# Patient Record
Sex: MALE | URBAN - METROPOLITAN AREA
[De-identification: names, ages, dates, MRNs, and addresses within clinical notes are randomized per-mention and may not be internally consistent; named-entity substitution may affect disease eponyms.]

---

## 2024-09-16 ENCOUNTER — CLAIMS CREATED FROM THE CLAIM WINDOW (OUTPATIENT)
Dept: URBAN - METROPOLITAN AREA MEDICAL CENTER 18 | Facility: MEDICAL CENTER | Age: 39
End: 2024-09-16
Payer: MEDICARE

## 2024-09-16 DIAGNOSIS — K50.914 ABSCESS OF INTESTINE DUE TO CROHN'S DISEASE: ICD-10-CM

## 2024-09-16 PROCEDURE — G8427 DOCREV CUR MEDS BY ELIG CLIN: HCPCS

## 2024-09-16 PROCEDURE — 99222 1ST HOSP IP/OBS MODERATE 55: CPT

## 2024-09-16 PROCEDURE — 99254 IP/OBS CNSLTJ NEW/EST MOD 60: CPT

## 2024-09-17 ENCOUNTER — TELEPHONE ENCOUNTER (OUTPATIENT)
Dept: URBAN - METROPOLITAN AREA MEDICAL CENTER 18 | Facility: MEDICAL CENTER | Age: 39
End: 2024-09-17

## 2024-09-17 ENCOUNTER — CLAIMS CREATED FROM THE CLAIM WINDOW (OUTPATIENT)
Dept: URBAN - METROPOLITAN AREA MEDICAL CENTER 18 | Facility: MEDICAL CENTER | Age: 39
End: 2024-09-17
Payer: MEDICARE

## 2024-09-17 DIAGNOSIS — K50.914 ABSCESS OF INTESTINE DUE TO CROHN'S DISEASE: ICD-10-CM

## 2024-09-17 PROCEDURE — 99232 SBSQ HOSP IP/OBS MODERATE 35: CPT

## 2024-09-18 ENCOUNTER — CLAIMS CREATED FROM THE CLAIM WINDOW (OUTPATIENT)
Dept: URBAN - METROPOLITAN AREA MEDICAL CENTER 18 | Facility: MEDICAL CENTER | Age: 39
End: 2024-09-18
Payer: MEDICARE

## 2024-09-18 DIAGNOSIS — K50.914 ABSCESS OF INTESTINE DUE TO CROHN'S DISEASE: ICD-10-CM

## 2024-09-18 PROCEDURE — 99232 SBSQ HOSP IP/OBS MODERATE 35: CPT

## 2024-09-19 ENCOUNTER — CLAIMS CREATED FROM THE CLAIM WINDOW (OUTPATIENT)
Dept: URBAN - METROPOLITAN AREA MEDICAL CENTER 18 | Facility: MEDICAL CENTER | Age: 39
End: 2024-09-19
Payer: MEDICARE

## 2024-09-19 DIAGNOSIS — K50.914 ABSCESS OF INTESTINE DUE TO CROHN'S DISEASE: ICD-10-CM

## 2024-09-19 PROCEDURE — 99232 SBSQ HOSP IP/OBS MODERATE 35: CPT

## 2024-09-30 ENCOUNTER — OFFICE VISIT (OUTPATIENT)
Dept: URBAN - METROPOLITAN AREA CLINIC 80 | Facility: CLINIC | Age: 39
End: 2024-09-30

## 2024-09-30 ENCOUNTER — LAB OUTSIDE AN ENCOUNTER (OUTPATIENT)
Dept: URBAN - METROPOLITAN AREA CLINIC 80 | Facility: CLINIC | Age: 39
End: 2024-09-30

## 2024-09-30 ENCOUNTER — TELEPHONE ENCOUNTER (OUTPATIENT)
Dept: URBAN - METROPOLITAN AREA CLINIC 80 | Facility: CLINIC | Age: 39
End: 2024-09-30

## 2024-09-30 ENCOUNTER — DASHBOARD ENCOUNTERS (OUTPATIENT)
Age: 39
End: 2024-09-30

## 2024-09-30 VITALS
HEIGHT: 71 IN | BODY MASS INDEX: 25.09 KG/M2 | DIASTOLIC BLOOD PRESSURE: 80 MMHG | WEIGHT: 179.2 LBS | SYSTOLIC BLOOD PRESSURE: 118 MMHG | TEMPERATURE: 97.5 F | HEART RATE: 79 BPM

## 2024-09-30 PROBLEM — 34000006: Status: ACTIVE | Noted: 2024-09-30

## 2024-09-30 PROBLEM — 271737000: Status: ACTIVE | Noted: 2024-09-30

## 2024-09-30 RX ORDER — DIPHENOXYLATE HYDROCHLORIDE AND ATROPINE SULFATE 2.5; .025 MG/1; MG/1
TABLET ORAL
Qty: 90 TABLET | Status: ACTIVE | COMMUNITY

## 2024-09-30 RX ORDER — DICYCLOMINE HYDROCHLORIDE 20 MG/1
TABLET ORAL
Qty: 60 TABLET | Status: ACTIVE | COMMUNITY

## 2024-09-30 RX ORDER — PREDNISONE 10 MG/1
TAKE 4 TABS PO X 1 WEEK, TAKE 3 TABS PO X 1 WEEK, TAKE 2 TABS PO X 1 WEEK, TAKE 1 TAB PO X 1 WEEK TABLET ORAL ONCE A DAY
Qty: 70 | Refills: 0 | OUTPATIENT
Start: 2024-09-30

## 2024-09-30 NOTE — HPI-TODAY'S VISIT:
Patient, with a hx of Crohn's disease, was seen at [facility] ER on 9/15/2024 c/o severe abdominal pain, n/v, and ?feculant emesis. Labs showed total bili 0.4, alk phos 60, AST 17, ALT 10, WBC high at 13.94, hgb low at 10.4, hct low at 33.6, MCV normal at 82.6. CT abd/pelvis showed Impression . . Severe inflammatory changes of the rectum compatible with proctitis with associated perirectal abscess. There is luminal narrowing of the rectum at the level of the abscess with proximal dilatation of the colon and small bowel suggesting at least partial obstruction. Small volume abdominopelvic ascites. Cholelithiasis. Patient was transferred from [place] to [facility] and general surgery saw him for presacral abscess. They advised IR place drain, noting that a presacral approach would potentially disrupt the ileal anal pouch. GI saw the pt on 9/16, noted that pt is s/p total proctocolectomy with ileal anal pouch. Pt noted he has had diarrhea x 11 years, and that his GI doctor is in [place] as he recently moved. 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 Plan was for pt to eventually start on biologic therapy, as he was currently managing on lomotil and creon. Initial diagnosis of UC made in 2015    Per GI consult note 9/18: Had lap total abdominal colectomy w/ end ileostomy (8/15/17) followed by lap completion proctectomy w/ Ileoanal J pouch anastomosis, diverting loop ileostomy (2/12/18), and the loop ileostomy reversal w/ small bowel resection, enteroenterostomy, and parastomal hernia repair (6/11/18) for treatment of his IBD. He was initially on Humira but was stopped due to patient losing insurance leading to flare which resulted in 8/17 hospitalization and total abdominal colectomy w/ end ileostomy which then led to the cascade of operations to follow. Humira was never restarted and patient did not follow up with a gastroenterologist due to no insurance coverage.  Colonoscopy 7/19/17:  Impression:  - Preparation of the colon was fair.    - The cecum is normal. Biopsied.   - The examined portion of the ileum was normal. Biopsied.    - Pancolitis. Inflammation was found from the rectum to the ascending colon. This was severe. Biopsies were taken from  ascending,transverse,descending,sigmoid and rectum.  Recommendation:  - Await pathology results.  - Return patient to hospital [facility] for ongoing care.   - Repeat colonoscopy at appointment to be scheduled to evaluate the response to therapy.   FINAL DIAGNOSIS Colonoscopy 7/19/17  A. Duodenum, biopsy: Duodenal mucosa with no significant pathologic alteration  B. Stomach, biopsy:Antral and oxyntic type mucosa with mild chronic gastritis No evidence of H. Pylori by routine stain  C. Esophagus, biopsy:Candida esophagitis GMS stain highlights fungal organisms D. Colon, cecum, biopsy:Focal active colitis , mild E. Terminal Ileum, biopsy:Small bowel mucosa with no significant pathologic alteration  F. Colon, ascending, biopsy:Chronic active colitis, moderate Negative for dysplasia G. Colon, transverse, biopsy: Chronic active colitis, moderate Negative for dysplasia H. Colon, descending, biopsy: Chronic active colitis, moderate - severe Negative for dysplasia I. Colon, sigmoid, biopsy: Chronic active colitis, severe Negative for dysplasia Immunohistochemical stain for CMV is negative J. Rectum, biopsy: Chronic active colitis, moderate - severe Negative for dysplasia    Presacral abscess noted on CT with mass effect on rectum and luminal narrowing with upstream dilatation to 3.5cm drained by IR.   CT guided aspiration on 9/17. Cultures grew e coli (R amp). ID saw pt and advised - Continue Zosyn at the hospital.    Quant gold and hepatitis panel negative.   KUB on 9/19/2024 showed FINDINGS: Unchanged dilated gas-filled loops of large bowel and small bowel without significant rectal gas. Exam technique precludes evaluation for free intraperitoneal air. Subsegmental atelectasis in the lung bases. Stable osseous structures  Patient was discharged on levaquin and flagyl.   Labs on 9/20 showed Alk phos 50, AST 20, ALT 24, total bili 0.2, WBC 6.91, hgb low at 10.0,  hct low at 33.5, MCV normal at 84.6.   TSH normal at 0.52 on 9.16   Lipid panel 9/16 showed normal cholestrol total 118, triglycerides 76, HDL 48, LDL 55, non HDL cholesterol 70.    Today: patient notes that he is doing much better since the hospital. Pt notes that he is having 10 BM a day bss 7. Pt notes that he has not had a solid BM in over 10 years. Pt notes that this is his baseline. Pt notes prior to hospital admission he would have blood in his stool. Seen in both the toilet and on the tissue. Pt notes that he only sees blood during a flare. He notes no blood in his BM since the hospital stay. Patient notes that he has had multiple fistulas through the years. He is unsure if he still have a fistula, per pt has seen colorectal and was told that "it was to high to reach" and there was nothing to be done about the fisula that was most recently diagnosed.   Before most recent hospital stay pt notes he was having a BM 25+ times per day.   Patient notes he "is always in pain", he takes percocet for this pain. Pt notes pain is generalized abdominal pain. The pain worsened prior to the hospital visit, but this pain has been present for years. Patient notes that he started having increased abdominal pain, mucus, and blood in stool about 1 week prior to going to the hospital. Pain has significantly improved since the hospital admission and abx. Patient notes that he is still having pain but it is basically back to his baseline. Patient notes has not done a course of steroids in years.   Patient notes that area of IR drainage no longer has inflammation.   He does think that he still has anal ulcers-notes that he has had these for years.   denies any EIM     Patient notes that he had a colonoscopy in [place] in 6 months ago-this showed a fistula.     No family history of colon polyps or colon cancer. denies any known family history of GI related cancer. Patient denies nausea, dysphagia. Patient denies blood thinner use, pacemaker/defibrillator, diabetes, kidney disease, and home O2.     Denies any heart or lung issues   occasional heartburn x years. Pt takes prn pepto and tums.   Patient notes having a dark stool once this afternoon, but did take pepto yesterday.   He has had these episodes in the past, notes that stools are black about once a week x years. Notes that this usually happens prior to rectal bleeding.   denies being dizzy, lightheaded, or like he is going to pass out.   Pt notes he used to be a heavy drinker-but notes that he is no longer a heavy drinker-only drinks occasionally   Pt notes he may be moving out of the country seen and cannot do an infusion on a regular basis.   pt is currently taking lomotil and dicyclomine   denies fever/chills     Pt notes last time he was on a biologic was 10 years ago-it was Humira and he was on it for 6 months without much relief.

## 2024-09-30 NOTE — PHYSICAL EXAM GASTROINTESTINAL
Abdomen , soft, nontender, nondistended , no guarding or rigidity , no masses palpable , normal bowel sounds, negative Staples's sign, negative Rovsing's, negative psoas and obturator signs, negative CVA tenderness bilaterally Liver and Spleen , no hepatosplenomegaly Rectal , deferred

## 2024-10-16 ENCOUNTER — TELEPHONE ENCOUNTER (OUTPATIENT)
Dept: URBAN - METROPOLITAN AREA CLINIC 80 | Facility: CLINIC | Age: 39
End: 2024-10-16

## 2024-10-16 RX ORDER — POLYETHYLENE GLYCOL 3350, SODIUM SULFATE, SODIUM CHLORIDE, POTASSIUM CHLORIDE, ASCORBIC ACID, SODIUM ASCORBATE 140-9-5.2G
AS DIRECTED KIT ORAL AS DIRECTED
Qty: 1 | Refills: 0 | OUTPATIENT
Start: 2024-10-17

## 2024-10-16 RX ORDER — DICYCLOMINE HYDROCHLORIDE 20 MG/1
1 TABLET TABLET ORAL THREE TIMES A DAY
Qty: 90 | Refills: 1 | OUTPATIENT
Start: 2024-10-17 | End: 2024-12-15

## 2024-10-17 ENCOUNTER — OFFICE VISIT (OUTPATIENT)
Dept: URBAN - METROPOLITAN AREA CLINIC 80 | Facility: CLINIC | Age: 39
End: 2024-10-17

## 2024-10-18 ENCOUNTER — TELEPHONE ENCOUNTER (OUTPATIENT)
Dept: URBAN - METROPOLITAN AREA CLINIC 80 | Facility: CLINIC | Age: 39
End: 2024-10-18

## 2024-10-18 PROBLEM — 87522002: Status: ACTIVE | Noted: 2024-10-18

## 2024-10-18 RX ORDER — UPADACITINIB 45 MG/1
1 TABLET TABLET, EXTENDED RELEASE ORAL ONCE A DAY
Qty: 84 TABLET | Refills: 0 | OUTPATIENT
Start: 2024-10-24 | End: 2025-01-15

## 2024-10-18 RX ORDER — UPADACITINIB 30 MG/1
1 TABLET TABLET, EXTENDED RELEASE ORAL ONCE A DAY
Qty: 90 TABLET | Refills: 3 | OUTPATIENT
Start: 2024-10-24 | End: 2025-10-19

## 2024-10-18 RX ORDER — ERGOCALCIFEROL 1.25 MG/1
1 CAPSULE CAPSULE ORAL
Qty: 8 | Refills: 0 | OUTPATIENT
Start: 2024-10-18 | End: 2024-12-16

## 2024-10-18 RX ORDER — FERRIC CARBOXYMALTOSE INJECTION 50 MG/ML
AS DIRECTED INJECTION, SOLUTION INTRAVENOUS
OUTPATIENT
Start: 2024-10-18 | End: 2024-11-01

## 2024-10-24 ENCOUNTER — TELEPHONE ENCOUNTER (OUTPATIENT)
Dept: URBAN - METROPOLITAN AREA CLINIC 97 | Facility: CLINIC | Age: 39
End: 2024-10-24

## 2024-10-25 ENCOUNTER — TELEPHONE ENCOUNTER (OUTPATIENT)
Dept: URBAN - METROPOLITAN AREA CLINIC 80 | Facility: CLINIC | Age: 39
End: 2024-10-25

## 2024-10-30 ENCOUNTER — TELEPHONE ENCOUNTER (OUTPATIENT)
Dept: URBAN - METROPOLITAN AREA CLINIC 80 | Facility: CLINIC | Age: 39
End: 2024-10-30

## 2024-10-30 ENCOUNTER — OFFICE VISIT (OUTPATIENT)
Dept: URBAN - METROPOLITAN AREA SURGERY CENTER 19 | Facility: SURGERY CENTER | Age: 39
End: 2024-10-30

## 2024-10-30 RX ORDER — FERRIC CARBOXYMALTOSE INJECTION 50 MG/ML
AS DIRECTED INJECTION, SOLUTION INTRAVENOUS
Status: ACTIVE | COMMUNITY
Start: 2024-10-18 | End: 2024-11-01

## 2024-10-30 RX ORDER — PREDNISONE 10 MG/1
TAKE 4 TABS PO X 1 WEEK, TAKE 3 TABS PO X 1 WEEK, TAKE 2 TABS PO X 1 WEEK, TAKE 1 TAB PO X 1 WEEK TABLET ORAL ONCE A DAY
Qty: 70 | Refills: 0 | Status: ACTIVE | COMMUNITY
Start: 2024-09-30

## 2024-10-30 RX ORDER — POLYETHYLENE GLYCOL 3350, SODIUM SULFATE, SODIUM CHLORIDE, POTASSIUM CHLORIDE, ASCORBIC ACID, SODIUM ASCORBATE 140-9-5.2G
AS DIRECTED KIT ORAL AS DIRECTED
Qty: 1 | Refills: 0 | Status: ACTIVE | COMMUNITY
Start: 2024-10-17

## 2024-10-30 RX ORDER — DICYCLOMINE HYDROCHLORIDE 20 MG/1
1 TABLET TABLET ORAL THREE TIMES A DAY
Qty: 90 | Refills: 1 | Status: ACTIVE | COMMUNITY
Start: 2024-10-17 | End: 2024-12-15

## 2024-10-30 RX ORDER — UPADACITINIB 30 MG/1
1 TABLET TABLET, EXTENDED RELEASE ORAL ONCE A DAY
Qty: 90 TABLET | Refills: 3 | Status: ACTIVE | COMMUNITY
Start: 2024-10-24 | End: 2025-10-19

## 2024-10-30 RX ORDER — UPADACITINIB 45 MG/1
1 TABLET TABLET, EXTENDED RELEASE ORAL ONCE A DAY
Qty: 84 TABLET | Refills: 0 | Status: ACTIVE | COMMUNITY
Start: 2024-10-24 | End: 2025-01-15

## 2024-10-30 RX ORDER — DICYCLOMINE HYDROCHLORIDE 20 MG/1
TABLET ORAL
Qty: 60 TABLET | Status: ACTIVE | COMMUNITY

## 2024-10-30 RX ORDER — ERGOCALCIFEROL 1.25 MG/1
1 CAPSULE CAPSULE ORAL
Qty: 8 | Refills: 0 | Status: ACTIVE | COMMUNITY
Start: 2024-10-18 | End: 2024-12-16

## 2024-10-30 RX ORDER — DIPHENOXYLATE HYDROCHLORIDE AND ATROPINE SULFATE 2.5; .025 MG/1; MG/1
TABLET ORAL
Qty: 90 TABLET | Status: ACTIVE | COMMUNITY

## 2024-10-31 ENCOUNTER — LAB OUTSIDE AN ENCOUNTER (OUTPATIENT)
Dept: URBAN - METROPOLITAN AREA CLINIC 80 | Facility: CLINIC | Age: 39
End: 2024-10-31

## 2024-10-31 ENCOUNTER — OFFICE VISIT (OUTPATIENT)
Dept: URBAN - METROPOLITAN AREA SURGERY CENTER 19 | Facility: SURGERY CENTER | Age: 39
End: 2024-10-31

## 2024-10-31 LAB
DATE:: (no result)
Lab: (no result)
SPECIMEN(S) SUBMITTED:: (no result)
TEST ORDERED ON REQ:: (no result)
UNCLEAR ORDER:: (no result)

## 2024-11-05 ENCOUNTER — TELEPHONE ENCOUNTER (OUTPATIENT)
Dept: URBAN - METROPOLITAN AREA CLINIC 80 | Facility: CLINIC | Age: 39
End: 2024-11-05

## 2024-11-05 RX ORDER — DIPHENOXYLATE HYDROCHLORIDE AND ATROPINE SULFATE 2.5; .025 MG/1; MG/1
1 TABLET AS NEEDED TABLET ORAL
Qty: 120 TABLET | Refills: 0 | OUTPATIENT
Start: 2024-11-06 | End: 2024-12-06

## 2024-11-12 ENCOUNTER — TELEPHONE ENCOUNTER (OUTPATIENT)
Dept: URBAN - METROPOLITAN AREA CLINIC 80 | Facility: CLINIC | Age: 39
End: 2024-11-12

## 2024-11-12 ENCOUNTER — ERX REFILL RESPONSE (OUTPATIENT)
Dept: URBAN - METROPOLITAN AREA CLINIC 80 | Facility: CLINIC | Age: 39
End: 2024-11-12

## 2024-11-12 RX ORDER — DICYCLOMINE HYDROCHLORIDE 20 MG/1
1 TABLET TABLET ORAL THREE TIMES A DAY
Qty: 90 | Refills: 1 | OUTPATIENT

## 2024-11-13 ENCOUNTER — CLAIMS CREATED FROM THE CLAIM WINDOW (OUTPATIENT)
Dept: URBAN - METROPOLITAN AREA MEDICAL CENTER 18 | Facility: MEDICAL CENTER | Age: 39
End: 2024-11-13
Payer: MEDICARE

## 2024-11-13 DIAGNOSIS — K50.813 CROHN'S DISEASE OF BOTH SMALL AND LARGE INTESTINE W FISTULA: ICD-10-CM

## 2024-11-13 DIAGNOSIS — K50.814 ABSCESS OF INTESTINE DUE TO CROHN'S DISEASE OF SMALL AND LARGE INTESTINES: ICD-10-CM

## 2024-11-13 PROCEDURE — G8427 DOCREV CUR MEDS BY ELIG CLIN: HCPCS

## 2024-11-13 PROCEDURE — 99255 IP/OBS CONSLTJ NEW/EST HI 80: CPT

## 2024-11-13 PROCEDURE — 99223 1ST HOSP IP/OBS HIGH 75: CPT

## 2024-11-14 ENCOUNTER — CLAIMS CREATED FROM THE CLAIM WINDOW (OUTPATIENT)
Dept: URBAN - METROPOLITAN AREA MEDICAL CENTER 18 | Facility: MEDICAL CENTER | Age: 39
End: 2024-11-14
Payer: MEDICARE

## 2024-11-14 ENCOUNTER — TELEPHONE ENCOUNTER (OUTPATIENT)
Dept: URBAN - METROPOLITAN AREA CLINIC 80 | Facility: CLINIC | Age: 39
End: 2024-11-14

## 2024-11-14 DIAGNOSIS — K50.818 CROHN'S DISEASE OF BOTH SMALL AND LARGE INTESTINE WITH OTHER COMPLICATION: ICD-10-CM

## 2024-11-14 PROCEDURE — 99232 SBSQ HOSP IP/OBS MODERATE 35: CPT

## 2024-11-15 ENCOUNTER — CLAIMS CREATED FROM THE CLAIM WINDOW (OUTPATIENT)
Dept: URBAN - METROPOLITAN AREA MEDICAL CENTER 18 | Facility: MEDICAL CENTER | Age: 39
End: 2024-11-15
Payer: MEDICARE

## 2024-11-15 DIAGNOSIS — K50.818 CROHN'S DISEASE OF BOTH SMALL AND LARGE INTESTINE WITH OTHER COMPLICATION: ICD-10-CM

## 2024-11-15 PROCEDURE — 99232 SBSQ HOSP IP/OBS MODERATE 35: CPT

## 2024-11-26 ENCOUNTER — TELEPHONE ENCOUNTER (OUTPATIENT)
Dept: URBAN - METROPOLITAN AREA CLINIC 80 | Facility: CLINIC | Age: 39
End: 2024-11-26

## 2024-12-02 ENCOUNTER — ERX REFILL RESPONSE (OUTPATIENT)
Dept: URBAN - METROPOLITAN AREA CLINIC 80 | Facility: CLINIC | Age: 39
End: 2024-12-02

## 2024-12-02 RX ORDER — DICYCLOMINE HYDROCHLORIDE 20 MG/1
1 TABLET TABLET ORAL THREE TIMES A DAY
Qty: 90 | Refills: 1 | OUTPATIENT

## 2024-12-02 RX ORDER — DICYCLOMINE HYDROCHLORIDE 20 MG/1
1 TABLET AS NEEDED TABLET ORAL THREE TIMES A DAY
Qty: 180 | Refills: 1 | OUTPATIENT

## 2024-12-24 ENCOUNTER — LAB OUTSIDE AN ENCOUNTER (OUTPATIENT)
Dept: URBAN - METROPOLITAN AREA CLINIC 80 | Facility: CLINIC | Age: 39
End: 2024-12-24

## 2025-04-28 ENCOUNTER — ERX REFILL RESPONSE (OUTPATIENT)
Dept: URBAN - METROPOLITAN AREA CLINIC 80 | Facility: CLINIC | Age: 40
End: 2025-04-28

## 2025-04-28 RX ORDER — DICYCLOMINE HYDROCHLORIDE 20 MG/1
1 TABLET AS NEEDED TABLET ORAL THREE TIMES A DAY
Qty: 180 | Refills: 1

## 2025-05-27 ENCOUNTER — TELEPHONE ENCOUNTER (OUTPATIENT)
Dept: URBAN - METROPOLITAN AREA CLINIC 80 | Facility: CLINIC | Age: 40
End: 2025-05-27

## 2025-06-20 ENCOUNTER — OFFICE VISIT (OUTPATIENT)
Dept: URBAN - METROPOLITAN AREA CLINIC 80 | Facility: CLINIC | Age: 40
End: 2025-06-20
Payer: MEDICARE

## 2025-06-20 DIAGNOSIS — K50.918 CROHN'S DISEASE WITH OTHER COMPLICATION, UNSPECIFIED GASTROINTESTINAL TRACT LOCATION: ICD-10-CM

## 2025-06-20 DIAGNOSIS — K80.20 GALL STONES: ICD-10-CM

## 2025-06-20 DIAGNOSIS — R79.89 LOW VITAMIN D LEVEL: ICD-10-CM

## 2025-06-20 DIAGNOSIS — R12 HEARTBURN: ICD-10-CM

## 2025-06-20 DIAGNOSIS — D64.9 ANEMIA, UNSPECIFIED TYPE: ICD-10-CM

## 2025-06-20 PROBLEM — 235919008: Status: ACTIVE | Noted: 2025-06-20

## 2025-06-20 PROCEDURE — 99215 OFFICE O/P EST HI 40 MIN: CPT

## 2025-06-20 RX ORDER — DIPHENOXYLATE HYDROCHLORIDE AND ATROPINE SULFATE 2.5; .025 MG/1; MG/1
TABLET ORAL
Qty: 90 TABLET | Status: ACTIVE | COMMUNITY

## 2025-06-20 RX ORDER — UPADACITINIB 30 MG/1
1 TABLET TABLET, EXTENDED RELEASE ORAL ONCE A DAY
Qty: 90 TABLET | Refills: 3 | Status: ACTIVE | COMMUNITY
Start: 2024-10-24 | End: 2025-10-19

## 2025-06-20 RX ORDER — PREDNISONE 10 MG/1
TAKE 4 TABS PO X 1 WEEK, TAKE 3 TABS PO X 1 WEEK, TAKE 2 TABS PO X 1 WEEK, TAKE 1 TAB PO X 1 WEEK TABLET ORAL ONCE A DAY
Qty: 70 | Refills: 0 | Status: ACTIVE | COMMUNITY
Start: 2024-09-30

## 2025-06-20 RX ORDER — DICYCLOMINE HYDROCHLORIDE 20 MG/1
1 TABLET AS NEEDED TABLET ORAL THREE TIMES A DAY
Qty: 180 | Refills: 1 | Status: ACTIVE | COMMUNITY

## 2025-06-20 RX ORDER — POLYETHYLENE GLYCOL 3350, SODIUM SULFATE, SODIUM CHLORIDE, POTASSIUM CHLORIDE, ASCORBIC ACID, SODIUM ASCORBATE 140-9-5.2G
AS DIRECTED KIT ORAL AS DIRECTED
Qty: 1 | Refills: 0 | Status: ACTIVE | COMMUNITY
Start: 2024-10-17

## 2025-06-20 NOTE — HPI-ZZZTODAY'S VISIT
Patient with a complex past medical history of Crohn's.  last seen in the office 9/30/2024.  He was put on a prednisone taper at this appointment.  Noted that he was hospitalized prior in the month for severe abdominal pain and nausea vomiting.  Found to have severe inflammatory changes of the rectum compatible with proctitis with associated Dwain rectal abscess and at least partial obstruction of the small bowel.  He was transferred from [facility] to [facility] general surgery for the presacral abscess.  An IR drain was placed and general surgery noted that the presacral  approach would potentially disrupt his ileoanal pouch.   History includes had a long lap total abdominal colectomy with end ileostomy 8/15/2017 followed by lap completion proctocolectomy with ileoanal J-pouch anastomosis, diverting loop ileostomy 2/12/2018 and the loop ileostomy reversal with small bowel resection, enteroenterostomy and parastomal hernia repair 6/11/2019 for treatment of IBD.  In the past patient has been on Humira but it was stopped due to patient losing insurance in 2017 this led to a flare and total abdominal colectomy with end ileostomy which led to the cascade of operations to follow.  Humira was never restarted.  Also noted the patient had occasional heartburn for years taking as needed Pepto and Tums.  Noted that patient was moving out of the country and would not be able to consider infusions on a regular basis.  Noted that patient was also taking dicyclomine and Lomotil as needed. Patient had an EGD and colonoscopy 10/30/2024 colonoscopy showed poor prep, Crohn's disease inflammation was found was mild in severity, fistula with pustular drainage seen in the distal J-pouch, could not transverse into the proximal limb of the J-pouch. EGD showed normal esophagus, Z-line regular, normal stomach, biopsies taken in the gastric antrum gastric body and incisor, normal duodenal bulb and second portion of the duodenum and third portion of the duodenum. 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  Noted the patient would need a repeat colonoscopy to evaluate his response to therapy.  Patient was started on Rinvoq. Patient was most recently admitted November 12, 2024 through November 16.  Noted the patient with a past medical history of complex Crohn's disease status post proctocolectomy of the J-pouch admitted for recurrent perirectal abscess.  Noted recurrent perirectal abscess likely d/t chronic J pouch anastomotic leak.  MRI L/sacrum with presacral fluid collection 2.4 x 1.2 cm.  IR was consulted and no drainable collection.  Colorectal surgery recommending J-pouch takedown and end ileostomy in the future no acute intervention.  Patient wanted to do a trial period with biologic management before making a final decision in regard to surgery.  He is status post Zosyn during this admission.  He was told to continue rinvoq and continue a steroid taper for 7 days.  He was discharged on Augmentin 875 twice a day plus Bactrim twice a day for 3 months. Most recent labs completed on 11/16/2024 showed sodium 138, potassium 3.4, white blood cell count 7.38, hemoglobin 7.7, hematocrit 26.0 Most recent LFTs completed 9/20/2024 showing alk phos 50, AST 20, ALT 24, total bilirubin 0.2. Patient had a CTE on 5/20/2025 showing again noted postoperative changes from proctocolectomy with ileal pouch anal anastomosis.  No evidence of irregular small bowel wall thickening, mural stratification, or mucosal enhancement.  impression states postoperative changes from proctocolectomy with ileal pouch to anal anastomosis, irregular soft tissue thickening and inflammatory stranding in the presacral space along the distal anastomosis with an associated rim-enhancing fluid and gas collection.  This is suggestive of a presacral abscess.  Irregular sclerosis along the anterior aspect of the S4 vertebral body, immediately adjacent to the presacral abscess this is suggestive of chronic osteomyelitis.  Mildly distended gallbladder with cholelithiasis, bilateral nonobstructive renal calculi. Of note patient did have evidence of chronic osteomyelitis on a CT that was completed prior to hospitalization 11/7/2024 noted on the CT chronic anterior sacral mild cortical thickening with focal area of sclerosis involving S4 which may represent changes of osteomyelitis. During his most recent hospitalization he had an MRI that showed chronic changes no signs of acute osteomyelitis. Patient saw infectious disease 2/4/2025 for presacral abscess, sacral osteitis vs chronic osteomyelitis and Crohn's disease he was advised to continue p.o. Augmentin 875 twice a day and Bactrim twice a day through 2/15/2025 he was given a refill of these antibiotics and was given a referral to a pain clinic per patient request.   Patient presents today as a follow up. He notes that his abdominal pain has improved. He notes he is having the pain in on occasion his lower abdomen that he knows is his "crohns". Went to a pain specialist and was put on Percocet. Anytime he has pain he will take a percocet and bentyl.  He is not taking lomotil at this point  he is taking rinvoq daily  he is still needing imodium 3 times a day  he is taking benyl daily  he has not had any fever or chills  He is having a BM 5-6 times per day bss 6-7 He is about to go to [place] for a month to enter a mental health facility  he is having blood in his stool once every 2 weeks  pt notes heartburn is well controlled on prn tums  Last time he saw [facility] was when he was hospitalized in November He last had an iron infusion in 12/2024 Pat[facility] denies blood thinner use, pacemaker/defibrillator, diabetes, kidney disease, and home O2.

## 2025-06-20 NOTE — PHYSICAL EXAM HENT:
Creatinine 1.76 on admission. Baseline 1.25-1.4.  follows with nephrology as op  Avoid hypotension, nephrotoxins   Head,  normocephalic,  atraumatic,  Face,  Face within normal limits,  Ears,  External ears within normal limits,  Nose/Nasopharynx,  External nose  normal appearance, no nasal discharge,  Mouth and Throat,  Oral cavity appearance normal Lips,  Appearance normal

## 2025-06-25 ENCOUNTER — CLAIMS CREATED FROM THE CLAIM WINDOW (OUTPATIENT)
Dept: URBAN - METROPOLITAN AREA MEDICAL CENTER 18 | Facility: MEDICAL CENTER | Age: 40
End: 2025-06-25
Payer: MEDICARE

## 2025-06-25 DIAGNOSIS — K50.019 CROHN'S DISEASE OF SMALL INTESTINE WITH UNSPECIFIED COMPLICATIONS: ICD-10-CM

## 2025-06-25 PROCEDURE — 99253 IP/OBS CNSLTJ NEW/EST LOW 45: CPT | Performed by: INTERNAL MEDICINE

## 2025-06-26 ENCOUNTER — CLAIMS CREATED FROM THE CLAIM WINDOW (OUTPATIENT)
Dept: URBAN - METROPOLITAN AREA MEDICAL CENTER 18 | Facility: MEDICAL CENTER | Age: 40
End: 2025-06-26
Payer: MEDICARE

## 2025-06-26 DIAGNOSIS — K50.019 CROHN'S DISEASE OF SMALL INTESTINE WITH UNSPECIFIED COMPLICATIONS: ICD-10-CM

## 2025-06-26 PROCEDURE — 99232 SBSQ HOSP IP/OBS MODERATE 35: CPT | Performed by: INTERNAL MEDICINE

## 2025-06-28 ENCOUNTER — CLAIMS CREATED FROM THE CLAIM WINDOW (OUTPATIENT)
Dept: URBAN - METROPOLITAN AREA MEDICAL CENTER 18 | Facility: MEDICAL CENTER | Age: 40
End: 2025-06-28
Payer: MEDICARE

## 2025-06-28 DIAGNOSIS — K50.90 CROHN'S DISEASE, UNSPECIFIED, WITHOUT COMPLICATIONS: ICD-10-CM

## 2025-06-28 PROCEDURE — 99233 SBSQ HOSP IP/OBS HIGH 50: CPT | Performed by: INTERNAL MEDICINE

## 2025-07-08 ENCOUNTER — TELEPHONE ENCOUNTER (OUTPATIENT)
Dept: URBAN - METROPOLITAN AREA MEDICAL CENTER 18 | Facility: MEDICAL CENTER | Age: 40
End: 2025-07-08

## 2025-07-08 ENCOUNTER — TELEPHONE ENCOUNTER (OUTPATIENT)
Dept: URBAN - METROPOLITAN AREA CLINIC 80 | Facility: CLINIC | Age: 40
End: 2025-07-08

## 2025-07-10 ENCOUNTER — CLAIMS CREATED FROM THE CLAIM WINDOW (OUTPATIENT)
Dept: URBAN - METROPOLITAN AREA CLINIC 4 | Facility: CLINIC | Age: 40
End: 2025-07-10
Payer: MEDICARE

## 2025-07-10 ENCOUNTER — OFFICE VISIT (OUTPATIENT)
Dept: URBAN - METROPOLITAN AREA SURGERY CENTER 19 | Facility: SURGERY CENTER | Age: 40
End: 2025-07-10
Payer: MEDICARE

## 2025-07-10 ENCOUNTER — TELEPHONE ENCOUNTER (OUTPATIENT)
Dept: URBAN - METROPOLITAN AREA CLINIC 80 | Facility: CLINIC | Age: 40
End: 2025-07-10

## 2025-07-10 DIAGNOSIS — K50.80 CROHN'S DISEASE OF BOTH SMALL AND LARGE INTESTINE WITHOUT COMPLICATION: ICD-10-CM

## 2025-07-10 DIAGNOSIS — K63.89 OTHER SPECIFIED DISEASES OF INTESTINE: ICD-10-CM

## 2025-07-10 DIAGNOSIS — K63.3 ULCER OF INTESTINE: ICD-10-CM

## 2025-07-10 DIAGNOSIS — Z09 ENCOUNTER FOR FOLLOW-UP EXAMINATION AFTER COMPLETED TREATMENT FOR CONDITIONS OTHER THAN MALIGNANT NEOPLASM: ICD-10-CM

## 2025-07-10 DIAGNOSIS — K62.89 PROCTITIS: ICD-10-CM

## 2025-07-10 DIAGNOSIS — Z98.0 INTESTINAL BYPASS AND ANASTOMOSIS STATUS: ICD-10-CM

## 2025-07-10 DIAGNOSIS — K62.89 OTHER SPECIFIED DISEASES OF ANUS AND RECTUM: ICD-10-CM

## 2025-07-10 DIAGNOSIS — K50.90 CROHN DISEASE: ICD-10-CM

## 2025-07-10 PROCEDURE — 44386 ENDOSCOPY BOWEL POUCH/BIOP: CPT | Performed by: CLINIC/CENTER

## 2025-07-10 PROCEDURE — 00811 ANES LWR INTST NDSC NOS: CPT | Performed by: NURSE ANESTHETIST, CERTIFIED REGISTERED

## 2025-07-10 PROCEDURE — 44386 ENDOSCOPY BOWEL POUCH/BIOP: CPT | Performed by: STUDENT IN AN ORGANIZED HEALTH CARE EDUCATION/TRAINING PROGRAM

## 2025-07-10 PROCEDURE — 88305 TISSUE EXAM BY PATHOLOGIST: CPT | Performed by: PATHOLOGY

## 2025-07-10 RX ORDER — PREDNISONE 10 MG/1
TAKE 4 TABS PO X 1 WEEK, TAKE 3 TABS PO X 1 WEEK, TAKE 2 TABS PO X 1 WEEK, TAKE 1 TAB PO X 1 WEEK TABLET ORAL ONCE A DAY
Qty: 70 | Refills: 0 | Status: ACTIVE | COMMUNITY
Start: 2024-09-30

## 2025-07-10 RX ORDER — DIPHENOXYLATE HYDROCHLORIDE AND ATROPINE SULFATE 2.5; .025 MG/1; MG/1
TABLET ORAL
Qty: 90 TABLET | Status: ACTIVE | COMMUNITY

## 2025-07-10 RX ORDER — POLYETHYLENE GLYCOL 3350, SODIUM SULFATE, SODIUM CHLORIDE, POTASSIUM CHLORIDE, ASCORBIC ACID, SODIUM ASCORBATE 140-9-5.2G
AS DIRECTED KIT ORAL AS DIRECTED
Qty: 1 | Refills: 0 | Status: ACTIVE | COMMUNITY
Start: 2024-10-17

## 2025-07-10 RX ORDER — DICYCLOMINE HYDROCHLORIDE 20 MG/1
1 TABLET AS NEEDED TABLET ORAL THREE TIMES A DAY
Qty: 180 | Refills: 1 | Status: ACTIVE | COMMUNITY

## 2025-07-10 RX ORDER — UPADACITINIB 30 MG/1
1 TABLET TABLET, EXTENDED RELEASE ORAL ONCE A DAY
Qty: 90 TABLET | Refills: 3 | Status: ACTIVE | COMMUNITY
Start: 2024-10-24 | End: 2025-10-19

## 2025-07-11 ENCOUNTER — TELEPHONE ENCOUNTER (OUTPATIENT)
Dept: URBAN - METROPOLITAN AREA CLINIC 50 | Facility: CLINIC | Age: 40
End: 2025-07-11

## 2025-07-30 ENCOUNTER — TELEPHONE ENCOUNTER (OUTPATIENT)
Dept: URBAN - METROPOLITAN AREA CLINIC 79 | Facility: CLINIC | Age: 40
End: 2025-07-30

## 2025-07-31 ENCOUNTER — ERX REFILL RESPONSE (OUTPATIENT)
Dept: URBAN - METROPOLITAN AREA CLINIC 80 | Facility: CLINIC | Age: 40
End: 2025-07-31

## 2025-07-31 ENCOUNTER — TELEPHONE ENCOUNTER (OUTPATIENT)
Dept: URBAN - METROPOLITAN AREA CLINIC 50 | Facility: CLINIC | Age: 40
End: 2025-07-31

## 2025-07-31 RX ORDER — DICYCLOMINE HYDROCHLORIDE 20 MG/1
1 TABLET AS NEEDED TABLET ORAL THREE TIMES A DAY
Qty: 180 | Refills: 1 | OUTPATIENT

## 2025-07-31 RX ORDER — DICYCLOMINE HYDROCHLORIDE 20 MG/1
TAKE 1 TABLET BY MOUTH THREE TIMES A DAY AS NEEDED TABLET ORAL
Qty: 270 TABLET | Refills: 1 | OUTPATIENT

## 2025-08-07 ENCOUNTER — OFFICE VISIT (OUTPATIENT)
Dept: URBAN - METROPOLITAN AREA CLINIC 80 | Facility: CLINIC | Age: 40
End: 2025-08-07
Payer: MEDICARE

## 2025-08-07 DIAGNOSIS — M86.60 CHRONIC OSTEOMYELITIS: ICD-10-CM

## 2025-08-07 DIAGNOSIS — K61.1 RECTAL ABSCESS: ICD-10-CM

## 2025-08-07 DIAGNOSIS — K91.89 ILEAL POUCH-ANAL ANASTOMOTIC LEAK: ICD-10-CM

## 2025-08-07 DIAGNOSIS — D64.9 ANEMIA, UNSPECIFIED TYPE: ICD-10-CM

## 2025-08-07 DIAGNOSIS — K50.918 CROHN'S DISEASE WITH OTHER COMPLICATION, UNSPECIFIED GASTROINTESTINAL TRACT LOCATION: ICD-10-CM

## 2025-08-07 PROBLEM — 197166005: Status: ACTIVE | Noted: 2025-08-07

## 2025-08-07 PROBLEM — 40970001: Status: ACTIVE | Noted: 2025-08-07

## 2025-08-07 PROBLEM — 236085004: Status: ACTIVE | Noted: 2025-08-07

## 2025-08-07 PROCEDURE — 992 APS NON BILLABLE: Performed by: STUDENT IN AN ORGANIZED HEALTH CARE EDUCATION/TRAINING PROGRAM

## 2025-08-07 RX ORDER — DIPHENOXYLATE HYDROCHLORIDE AND ATROPINE SULFATE 2.5; .025 MG/1; MG/1
TABLET ORAL
Qty: 90 TABLET | Status: ACTIVE | COMMUNITY

## 2025-08-07 RX ORDER — CIPROFLOXACIN HYDROCHLORIDE 750 MG/1
TABLET, FILM COATED ORAL
Qty: 56 TABLET | Status: ACTIVE | COMMUNITY

## 2025-08-07 RX ORDER — UPADACITINIB 30 MG/1
1 TABLET TABLET, EXTENDED RELEASE ORAL ONCE A DAY
Qty: 90 TABLET | Refills: 3 | Status: ACTIVE | COMMUNITY
Start: 2024-10-24 | End: 2025-10-19

## 2025-08-07 RX ORDER — POLYETHYLENE GLYCOL 3350, SODIUM SULFATE, SODIUM CHLORIDE, POTASSIUM CHLORIDE, ASCORBIC ACID, SODIUM ASCORBATE 140-9-5.2G
AS DIRECTED KIT ORAL AS DIRECTED
Qty: 1 | Refills: 0 | Status: ACTIVE | COMMUNITY
Start: 2024-10-17

## 2025-08-07 RX ORDER — PREDNISONE 10 MG/1
TAKE 4 TABS PO X 1 WEEK, TAKE 3 TABS PO X 1 WEEK, TAKE 2 TABS PO X 1 WEEK, TAKE 1 TAB PO X 1 WEEK TABLET ORAL ONCE A DAY
Qty: 70 | Refills: 0 | Status: ACTIVE | COMMUNITY
Start: 2024-09-30

## 2025-08-07 RX ORDER — METRONIDAZOLE 500 MG/1
TABLET ORAL
Qty: 84 TABLET | Status: ACTIVE | COMMUNITY

## 2025-08-07 RX ORDER — DICYCLOMINE HYDROCHLORIDE 20 MG/1
TAKE 1 TABLET BY MOUTH THREE TIMES A DAY AS NEEDED TABLET ORAL
Qty: 270 TABLET | Refills: 1 | Status: ACTIVE | COMMUNITY